# Patient Record
Sex: FEMALE | Employment: FULL TIME | URBAN - METROPOLITAN AREA
[De-identification: names, ages, dates, MRNs, and addresses within clinical notes are randomized per-mention and may not be internally consistent; named-entity substitution may affect disease eponyms.]

---

## 2017-05-15 ENCOUNTER — APPOINTMENT (OUTPATIENT)
Dept: CT IMAGING | Age: 42
End: 2017-05-15
Attending: EMERGENCY MEDICINE
Payer: COMMERCIAL

## 2017-05-15 ENCOUNTER — HOSPITAL ENCOUNTER (EMERGENCY)
Age: 42
Discharge: HOME | End: 2017-05-15
Attending: EMERGENCY MEDICINE
Payer: COMMERCIAL

## 2017-05-15 VITALS
SYSTOLIC BLOOD PRESSURE: 146 MMHG | HEART RATE: 89 BPM | BODY MASS INDEX: 37.39 KG/M2 | TEMPERATURE: 97.9 F | OXYGEN SATURATION: 98 % | RESPIRATION RATE: 23 BRPM | HEIGHT: 63 IN | WEIGHT: 211 LBS | DIASTOLIC BLOOD PRESSURE: 82 MMHG

## 2017-05-15 DIAGNOSIS — R73.9 HYPERGLYCEMIA: ICD-10-CM

## 2017-05-15 DIAGNOSIS — R20.2 PARESTHESIA AND PAIN OF RIGHT EXTREMITY: Primary | ICD-10-CM

## 2017-05-15 DIAGNOSIS — M79.609 PARESTHESIA AND PAIN OF RIGHT EXTREMITY: Primary | ICD-10-CM

## 2017-05-15 DIAGNOSIS — E28.2 PCOS (POLYCYSTIC OVARIAN SYNDROME): ICD-10-CM

## 2017-05-15 LAB
ANION GAP BLD CALC-SCNC: 6 MMOL/L (ref 3–18)
APPEARANCE UR: CLEAR
ATRIAL RATE: 93 BPM
BACTERIA URNS QL MICRO: ABNORMAL /HPF
BASOPHILS # BLD AUTO: 0 K/UL (ref 0–0.06)
BASOPHILS # BLD: 0 % (ref 0–2)
BILIRUB UR QL: NEGATIVE
BUN SERPL-MCNC: 10 MG/DL (ref 7–18)
BUN/CREAT SERPL: 13 (ref 12–20)
CALCIUM SERPL-MCNC: 9 MG/DL (ref 8.5–10.1)
CALCULATED P AXIS, ECG09: 66 DEGREES
CALCULATED R AXIS, ECG10: 69 DEGREES
CALCULATED T AXIS, ECG11: 34 DEGREES
CHLORIDE SERPL-SCNC: 101 MMOL/L (ref 100–108)
CK MB CFR SERPL CALC: 0.8 % (ref 0–4)
CK MB SERPL-MCNC: 1 NG/ML (ref 5–25)
CK SERPL-CCNC: 129 U/L (ref 26–192)
CO2 SERPL-SCNC: 33 MMOL/L (ref 21–32)
COLOR UR: YELLOW
CREAT SERPL-MCNC: 0.76 MG/DL (ref 0.6–1.3)
DIAGNOSIS, 93000: NORMAL
DIFFERENTIAL METHOD BLD: ABNORMAL
EOSINOPHIL # BLD: 0.2 K/UL (ref 0–0.4)
EOSINOPHIL NFR BLD: 2 % (ref 0–5)
EPITH CASTS URNS QL MICRO: ABNORMAL /LPF (ref 0–5)
ERYTHROCYTE [DISTWIDTH] IN BLOOD BY AUTOMATED COUNT: 14.1 % (ref 11.6–14.5)
GLUCOSE SERPL-MCNC: 189 MG/DL (ref 74–99)
GLUCOSE UR STRIP.AUTO-MCNC: 500 MG/DL
HCT VFR BLD AUTO: 39.5 % (ref 35–45)
HGB BLD-MCNC: 12.7 G/DL (ref 12–16)
HGB UR QL STRIP: NEGATIVE
KETONES UR QL STRIP.AUTO: NEGATIVE MG/DL
LEUKOCYTE ESTERASE UR QL STRIP.AUTO: ABNORMAL
LYMPHOCYTES # BLD AUTO: 27 % (ref 21–52)
LYMPHOCYTES # BLD: 2.7 K/UL (ref 0.9–3.6)
MCH RBC QN AUTO: 27.7 PG (ref 24–34)
MCHC RBC AUTO-ENTMCNC: 32.2 G/DL (ref 31–37)
MCV RBC AUTO: 86.2 FL (ref 74–97)
MONOCYTES # BLD: 0.6 K/UL (ref 0.05–1.2)
MONOCYTES NFR BLD AUTO: 6 % (ref 3–10)
NEUTS SEG # BLD: 6.5 K/UL (ref 1.8–8)
NEUTS SEG NFR BLD AUTO: 65 % (ref 40–73)
NITRITE UR QL STRIP.AUTO: NEGATIVE
P-R INTERVAL, ECG05: 154 MS
PH UR STRIP: 7 [PH] (ref 5–8)
PLATELET # BLD AUTO: 424 K/UL (ref 135–420)
PMV BLD AUTO: 9.6 FL (ref 9.2–11.8)
POTASSIUM SERPL-SCNC: 3.4 MMOL/L (ref 3.5–5.5)
PROT UR STRIP-MCNC: NEGATIVE MG/DL
Q-T INTERVAL, ECG07: 354 MS
QRS DURATION, ECG06: 74 MS
QTC CALCULATION (BEZET), ECG08: 440 MS
RBC # BLD AUTO: 4.58 M/UL (ref 4.2–5.3)
RBC #/AREA URNS HPF: ABNORMAL /HPF (ref 0–5)
SODIUM SERPL-SCNC: 140 MMOL/L (ref 136–145)
SP GR UR REFRACTOMETRY: 1.02 (ref 1–1.03)
TROPONIN I SERPL-MCNC: <0.02 NG/ML (ref 0–0.06)
TSH SERPL DL<=0.05 MIU/L-ACNC: 1.19 UIU/ML (ref 0.36–3.74)
UROBILINOGEN UR QL STRIP.AUTO: 1 EU/DL (ref 0.2–1)
VENTRICULAR RATE, ECG03: 93 BPM
WBC # BLD AUTO: 10 K/UL (ref 4.6–13.2)
WBC URNS QL MICRO: ABNORMAL /HPF (ref 0–4)

## 2017-05-15 PROCEDURE — 81001 URINALYSIS AUTO W/SCOPE: CPT | Performed by: EMERGENCY MEDICINE

## 2017-05-15 PROCEDURE — 70450 CT HEAD/BRAIN W/O DYE: CPT

## 2017-05-15 PROCEDURE — 82550 ASSAY OF CK (CPK): CPT | Performed by: EMERGENCY MEDICINE

## 2017-05-15 PROCEDURE — 84480 ASSAY TRIIODOTHYRONINE (T3): CPT | Performed by: EMERGENCY MEDICINE

## 2017-05-15 PROCEDURE — 99284 EMERGENCY DEPT VISIT MOD MDM: CPT

## 2017-05-15 PROCEDURE — 85025 COMPLETE CBC W/AUTO DIFF WBC: CPT | Performed by: EMERGENCY MEDICINE

## 2017-05-15 PROCEDURE — 80048 BASIC METABOLIC PNL TOTAL CA: CPT | Performed by: EMERGENCY MEDICINE

## 2017-05-15 PROCEDURE — 84443 ASSAY THYROID STIM HORMONE: CPT | Performed by: EMERGENCY MEDICINE

## 2017-05-15 PROCEDURE — 93005 ELECTROCARDIOGRAM TRACING: CPT

## 2017-05-15 PROCEDURE — 99283 EMERGENCY DEPT VISIT LOW MDM: CPT

## 2017-05-15 RX ORDER — ACYCLOVIR 800 MG/1
800 TABLET ORAL 2 TIMES DAILY
COMMUNITY

## 2017-05-15 NOTE — LETTER
NOTIFICATION RETURN TO WORK / SCHOOL 
 
5/15/2017 3:29 PM 
 
Ms. Helga Martines [address] [place] [id number] To Whom It May Concern: 
 
Abril Cavazos is currently under the care of [id number] [place] [address] EMERGENCY DEPT. She will return to work/school on: 5/18/17 If there are questions or concerns please have the patient contact our office.  
 
 
 
Sincerely, 
 
 
 
Mihai Hardy MD

## 2017-05-15 NOTE — ED TRIAGE NOTES
Pt reports right sided face, arm and chest tightness and numbness since 1000. Pt denies any pain, nausea, vomiting, diarrhea or fever. Pt reports generalized fatigue x 3 days. Pt ambulatory with steady gait, moving all extremities without difficulty and no facial droop present. Pt denies any visual changes. Pt text ng on cell phone without difficulty after triage completed.

## 2017-05-15 NOTE — ED NOTES
Valencia Morrow is a 35 y.o. female that was discharged in stable. Pt was accompanied by self. Pt is driving. The patients diagnosis, condition and treatment were explained to  patient and aftercare instructions were given. The patient verbalized understanding. Patient armband removed and shredded.

## 2017-05-15 NOTE — ED PROVIDER NOTES
HPI Comments: 1:39 PM Nathaniel Naylor is a 35 y.o. Female, G-4, P-2, A-2 (misscarriages) with a hx of PCOS who presents to the ED c/o right sided numbness that extends from the right forehead down her face and into her upper and lower extremities that started at 1000 this morning. She reports that she has been fatigued over the past three days and two days ago she began to experiencing some mid back pain upon waking up. She initially thought that her sx were due to not sleeping well, but she became concerned when she started to experience the right sided numbness this morning, so she decided to report to the ED. 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 Her father  in his [de-identified] of alzheimer's disease;     [address] [place] [address]  her mother has a hx of HTN, CVA x2, and MI x2 but she is living and over [de-identified] at this time; her sister has a hx of diabetes. She just finished her period last week and her menses are regular despite her hx of PCOS. She denies hx of CVA, hx of HTN, hx of diabetes, trauma, chest pain, HA, and any further complaints. Soc Hx Non smoker/etoh    The history is provided by the patient. Past Medical History:   Diagnosis Date    PCOS (polycystic ovarian syndrome)     Strabismus        Past Surgical History:   Procedure Laterality Date    HX GYN      c/s x 2         History reviewed. No pertinent family history. Social History     Social History    Marital status:      Spouse name: N/A    Number of children: N/A    Years of education: N/A     Occupational History    Not on file.      Social History Main Topics    Smoking status: Never Smoker    Smokeless tobacco: Not on file    Alcohol use Yes      Comment: social    Drug use: No    Sexual activity: Yes     Birth control/ protection: None     Other Topics Concern    Not on file     Social History Narrative         ALLERGIES: Review of patient's allergies indicates no known allergies. Review of Systems   Constitutional: Positive for fatigue. Negative for chills and fever. HENT: Negative for congestion and sore throat. Respiratory: Negative for cough and shortness of breath. Cardiovascular: Negative for chest pain and leg swelling. Gastrointestinal: Negative for abdominal pain and nausea. Genitourinary: Negative for dysuria and hematuria. Musculoskeletal: Positive for back pain. Skin: Negative for rash and wound. Neurological: Positive for numbness. Negative for syncope, light-headedness and headaches. Psychiatric/Behavioral: Negative for behavioral problems. The patient is not nervous/anxious. Vitals:    05/15/17 1307   BP: (!) 160/102   Pulse: 95   Resp: 16   Temp: 97.9 °F (36.6 °C)   SpO2: 97%   Weight: 95.7 kg (211 lb)   Height: 5' 3\" (1.6 m)            Physical Exam   Constitutional: She is oriented to person, place, and time. She appears well-developed and well-nourished. No distress. HENT:   Head: Normocephalic. Right Ear: External ear normal.   Left Ear: External ear normal.   Mouth/Throat: No oropharyngeal exudate. Eyes: Conjunctivae and EOM are normal. Pupils are equal, round, and reactive to light. Right eye exhibits no discharge. Left eye exhibits no discharge. No scleral icterus. Neck: Normal range of motion. Neck supple. No tracheal deviation present. No thyromegaly present. Cardiovascular: Normal rate, regular rhythm and normal heart sounds. Exam reveals no gallop and no friction rub. No murmur heard. Pulmonary/Chest: Effort normal and breath sounds normal. No stridor. No respiratory distress. She has no wheezes. She has no rales. She exhibits no tenderness. Abdominal: Soft. She exhibits no distension and no mass. There is no tenderness. There is no rebound and no guarding.    Musculoskeletal: She exhibits no edema or tenderness. Lymphadenopathy:     She has no cervical adenopathy. Neurological: She is alert and oriented to person, place, and time. She has normal reflexes. No cranial nerve deficit. Coordination normal.   DANIELLE EOM OK. h-HIT normal response. Visual fields OK  Reflexes symmetrical at zero. Skin: Skin is warm. Psychiatric: She has a normal mood and affect. Her behavior is normal. Judgment and thought content normal.        MDM  Number of Diagnoses or Management Options  Hyperglycemia:   Paresthesia and pain of right extremity:   PCOS (polycystic ovarian syndrome):   Diagnosis management comments: Imp: Working 12 hrs daily - 2 children and - stressed. PCOS with hyperglycemia - to LMD for ecvaluation of new onset diabetes. ED Course       Procedures    Vitals:  Patient Vitals for the past 12 hrs:   Temp Pulse Resp BP SpO2   05/15/17 1307 97.9 °F (36.6 °C) 95 16 (!) 160/102 97 %     Pulse ox reviewed and WNL    Medications ordered:   Medications - No data to display      Lab findings:  Recent Results (from the past 12 hour(s))   EKG, 12 LEAD, INITIAL    Collection Time: 05/15/17  1:06 PM   Result Value Ref Range    Ventricular Rate 93 BPM    Atrial Rate 93 BPM    P-R Interval 154 ms    QRS Duration 74 ms    Q-T Interval 354 ms    QTC Calculation (Bezet) 440 ms    Calculated P Axis 66 degrees    Calculated R Axis 69 degrees    Calculated T Axis 34 degrees    Diagnosis       Normal sinus rhythm  Normal ECG  No previous ECGs available         EKG interpretation by ED Physician:  none    X-Ray, CT or other radiology findings or impressions:  CT HEAD WO CONT    (Results Pending)       Progress notes, Consult notes or additional Procedure notes:       Disposition:  Diagnosis: No diagnosis found. Disposition: home.   Recent Results (from the past 12 hour(s))   EKG, 12 LEAD, INITIAL    Collection Time: 05/15/17  1:06 PM   Result Value Ref Range    Ventricular Rate 93 BPM    Atrial Rate 93 BPM P-R Interval 154 ms    QRS Duration 74 ms    Q-T Interval 354 ms    QTC Calculation (Bezet) 440 ms    Calculated P Axis 66 degrees    Calculated R Axis 69 degrees    Calculated T Axis 34 degrees    Diagnosis       Normal sinus rhythm  Normal ECG  No previous ECGs available  Confirmed by Ciara Morales MD, --- ([id number]) on 5/15/2017 2:49:23 PM     CBC WITH AUTOMATED DIFF    Collection Time: 05/15/17  1:35 PM   Result Value Ref Range    WBC 10.0 4.6 - 13.2 K/uL    RBC 4.58 4.20 - 5.30 M/uL    HGB 12.7 12.0 - 16.0 g/dL    HCT 39.5 35.0 - 45.0 %    MCV 86.2 74.0 - 97.0 FL    MCH 27.7 24.0 - 34.0 PG    MCHC 32.2 31.0 - 37.0 g/dL    RDW 14.1 11.6 - 14.5 %    PLATELET 057 (H) 116 - 420 K/uL    MPV 9.6 9.2 - 11.8 FL    NEUTROPHILS 65 40 - 73 %    LYMPHOCYTES 27 21 - 52 %    MONOCYTES 6 3 - 10 %    EOSINOPHILS 2 0 - 5 %    BASOPHILS 0 0 - 2 %    ABS. NEUTROPHILS 6.5 1.8 - 8.0 K/UL    ABS. LYMPHOCYTES 2.7 0.9 - 3.6 K/UL    ABS. MONOCYTES 0.6 0.05 - 1.2 K/UL    ABS. EOSINOPHILS 0.2 0.0 - 0.4 K/UL    ABS.  BASOPHILS 0.0 0.0 - 0.06 K/UL    DF AUTOMATED     METABOLIC PANEL, BASIC    Collection Time: 05/15/17  1:35 PM   Result Value Ref Range    Sodium 140 136 - 145 mmol/L    Potassium 3.4 (L) 3.5 - 5.5 mmol/L    Chloride 101 100 - 108 mmol/L    CO2 33 (H) 21 - 32 mmol/L    Anion gap 6 3.0 - 18 mmol/L    Glucose 189 (H) 74 - 99 mg/dL    BUN 10 7.0 - 18 MG/DL    Creatinine 0.76 0.6 - 1.3 MG/DL    BUN/Creatinine ratio 13 12 - 20      GFR est AA >60 >60 ml/min/1.73m2    GFR est non-AA >60 >60 ml/min/1.73m2    Calcium 9.0 8.5 - 10.1 MG/DL   CARDIAC PANEL,(CK, CKMB & TROPONIN)    Collection Time: 05/15/17  1:35 PM   Result Value Ref Range     26 - 192 U/L    CK - MB 1.0 <3.6 ng/ml    CK-MB Index 0.8 0.0 - 4.0 %    Troponin-I, Qt. <0.02 0.00 - 0.06 NG/ML   TSH 3RD GENERATION    Collection Time: 05/15/17  1:35 PM   Result Value Ref Range    TSH 1.19 0.36 - 3.74 uIU/mL   URINALYSIS W/ RFLX MICROSCOPIC    Collection Time: 05/15/17  2:00 PM Result Value Ref Range    Color YELLOW      Appearance CLEAR      Specific gravity 1.023 1.005 - 1.030      pH (UA) 7.0 5.0 - 8.0      Protein NEGATIVE  NEG mg/dL    Glucose 500 (A) NEG mg/dL    Ketone NEGATIVE  NEG mg/dL    Bilirubin NEGATIVE  NEG      Blood NEGATIVE  NEG      Urobilinogen 1.0 0.2 - 1.0 EU/dL    Nitrites NEGATIVE  NEG      Leukocyte Esterase TRACE (A) NEG     URINE MICROSCOPIC ONLY    Collection Time: 05/15/17  2:00 PM   Result Value Ref Range    WBC 0 to 3 0 - 4 /hpf    RBC NONE 0 - 5 /hpf    Epithelial cells 2+ 0 - 5 /lpf    Bacteria 3+ (A) NEG /hpf       Medications ordered:   Medications - No data to display      Ct Head Wo Cont    Result Date: 5/15/2017  EXAM: CT of the Head without contrast INDICATION: Right-sided face, arm and chest tightness. TECHNIQUE: CT of the head from the vertex to the skull base performed. No IV contrast administered. All CT scans at this facility are performed using dose optimization technique as appropriate to a performed exam, to include automated exposure control, adjustment of the mA and/or kV according to patient size (including appropriate matching for site specific examination) or use of iterative reconstruction technique. COMPARISON: None. FINDINGS: No evidence of acute intra-axial or extra-axial hemorrhage. The ventricles and sulci are symmetric. No midline shift, mass effect or mass lesion appreciated. The gray-white junction is preserved. No evidence of an acute infarct identified. The mastoid air cells are well aerated. The paranasal sinuses are unremarkable. The orbits are normal. The scalp and skull are unremarkable. IMPRESSION: 1. No acute intracranial process identified. 1. Paresthesia and pain of right extremity    2. PCOS (polycystic ovarian syndrome)    3. Hyperglycemia            Follow-up Information     None      To family MD for evaluation of new onset diabetes.      Patient's Medications   Start Taking    No medications on file Continue Taking    ACYCLOVIR (ZOVIRAX) 800 MG TABLET    Take 800 mg by mouth two (2) times a day. METFORMIN (GLUCOPHAGE) 500 MG TABLET    Take 500 mg by mouth two (2) times daily (with meals). PNV COMB #2-IRON-OMEGA 3-FA 29-1-250 MG CMPK    Take  by mouth. These Medications have changed    No medications on file   Stop Taking    No medications on file       SCRIBE ATTESTATION STATEMENT  Documented by: Meghan Ahmadi for, and in the presence of, Mamadou Quach MD 1:40 PM     Signed by: Romel Carson, 05/15/17 1:40 PM     PROVIDER ATTESTATION STATEMENT  I personally performed the services described in the documentation, reviewed the documentation, as recorded by the scribe in my presence, and it accurately and completely records my words and actions.   Mamadou Quach MD

## 2017-05-15 NOTE — DISCHARGE INSTRUCTIONS
Learning About High Blood Sugar  What is high blood sugar? Your body turns the food you eat into glucose (sugar), which it uses for energy. But if your body isn't able to use the sugar right away, it can build up in your blood and lead to high blood sugar. When the amount of sugar in your blood stays too high for too much of the time, you may have diabetes. Diabetes is a disease that can cause serious health problems. The good news is that lifestyle changes may help you get your blood sugar back to normal and avoid or delay diabetes. What causes high blood sugar? Sugar (glucose) can build up in your blood if you:  · Are overweight. · Have a family history of diabetes. · Take certain medicines, such as steroids. What are the symptoms? Having high blood sugar may not cause any symptoms at all. Or it may make you feel very thirsty or very hungry. You may also urinate more often than usual, have blurry vision, or lose weight without trying. How is high blood sugar treated? You can take steps to lower your blood sugar level if you understand what makes it get higher. Your doctor may want you to learn how to test your blood sugar level at home. Then you can see how illness, stress, or different kinds of food or medicine raise or lower your blood sugar level. Other tests may be needed to see if you have diabetes. How can you prevent high blood sugar? · Watch your weight. If you're overweight, losing just a small amount of weight may help. Reducing fat around your waist is most important. · Limit the amount of calories, sweets, and unhealthy fat you eat. Ask your doctor if a dietitian can help you. A registered dietitian can help you create meal plans that fit your lifestyle. · Get at least 30 minutes of exercise on most days of the week. Exercise helps control your blood sugar. It also helps you maintain a healthy weight. Walking is a good choice.  You also may want to do other activities, such as running, swimming, cycling, or playing tennis or team sports. · If your doctor prescribed medicines, take them exactly as prescribed. Call your doctor if you think you are having a problem with your medicine. You will get more details on the specific medicines your doctor prescribes. Follow-up care is a key part of your treatment and safety. Be sure to make and go to all appointments, and call your doctor if you are having problems. It's also a good idea to know your test results and keep a list of the medicines you take. Where can you learn more? Go to http://sonam-mauro.info/. Enter O108 in the search box to learn more about \"Learning About High Blood Sugar. \"  Current as of: May 23, 2016  Content Version: 11.2  © 0568-3224 [employer]. Care instructions adapted under license by [employer] (which disclaims liability or warranty for this information). If you have questions about a medical condition or this instruction, always ask your healthcare professional. [address] any warranty or liability for your use of this information.

## 2017-05-16 LAB — T3 SERPL-MCNC: 114 NG/DL (ref 71–180)

## 2018-05-17 ENCOUNTER — HOSPITAL ENCOUNTER (EMERGENCY)
Age: 43
Discharge: HOME | End: 2018-05-17
Attending: EMERGENCY MEDICINE
Payer: COMMERCIAL

## 2018-05-17 VITALS
RESPIRATION RATE: 16 BRPM | TEMPERATURE: 97 F | BODY MASS INDEX: 37.21 KG/M2 | HEART RATE: 87 BPM | WEIGHT: 210 LBS | DIASTOLIC BLOOD PRESSURE: 78 MMHG | HEIGHT: 63 IN | OXYGEN SATURATION: 95 % | SYSTOLIC BLOOD PRESSURE: 120 MMHG

## 2018-05-17 DIAGNOSIS — R04.0 EPISTAXIS: Primary | ICD-10-CM

## 2018-05-17 PROCEDURE — 99282 EMERGENCY DEPT VISIT SF MDM: CPT

## 2018-05-17 PROCEDURE — 74011250637 HC RX REV CODE- 250/637: Performed by: PHYSICIAN ASSISTANT

## 2018-05-17 RX ORDER — OXYMETAZOLINE HCL 0.05 %
2 SPRAY, NON-AEROSOL (ML) NASAL
Status: COMPLETED | OUTPATIENT
Start: 2018-05-17 | End: 2018-05-17

## 2018-05-17 RX ADMIN — OXYMETAZOLINE HYDROCHLORIDE 2 SPRAY: 5 SPRAY NASAL at 10:17

## 2018-05-17 NOTE — ED PROVIDER NOTES
EMERGENCY DEPARTMENT HISTORY AND PHYSICAL EXAM    10:16 AM      Date: 5/17/2018  Patient Name: Flores Astudillo    History of Presenting Illness     Chief Complaint   Patient presents with    Epistaxis         History Provided By: Patient    Chief Complaint: presents with R nostril bleeding started today while she was driving to work which had since resolved. Denies of any trauma no headache, not taking any blood thinner. No blood pressure issues or hx of similar bleed in the past.   Duration:  today   Timing:  Acute  Location: as noted above  Quality: no pain   Severity: Mild  Modifying Factors: as noted above  Associated Symptoms: denies any other associated signs or symptoms      Additional History (Context): Flores Astudillo is a 38 y.o. female with No significant past medical history who presents with sx as noted above. Ashlee Murray PCP: Francisco Sandoval MD    Current Outpatient Prescriptions   Medication Sig Dispense Refill    acyclovir (ZOVIRAX) 800 mg tablet Take 800 mg by mouth two (2) times a day.  PNV Comb #2-Iron-Omega 3-FA 29-1-250 mg cmpk Take  by mouth.  metFORMIN (GLUCOPHAGE) 500 mg tablet Take 500 mg by mouth two (2) times daily (with meals). Past History     Past Medical History:  Past Medical History:   Diagnosis Date    PCOS (polycystic ovarian syndrome)     Strabismus        Past Surgical History:  Past Surgical History:   Procedure Laterality Date    HX GYN      c/s x 2       Family History:  History reviewed. No pertinent family history. Social History:  Social History   Substance Use Topics    Smoking status: Never Smoker    Smokeless tobacco: None    Alcohol use Yes      Comment: social       Allergies:  No Known Allergies      Review of Systems       Review of Systems   Constitutional: Negative for fever. HENT: Positive for nosebleeds. Neurological: Negative for headaches. All other systems reviewed and are negative.         Physical Exam     Visit Vitals    /78 (BP 1 Location: Left arm, BP Patient Position: At rest)    Pulse 87    Temp 97 °F (36.1 °C)    Resp 16    Ht 5' 3\" (1.6 m)    Wt 95.3 kg (210 lb)    SpO2 95%    BMI 37.2 kg/m2         Physical Exam   Constitutional: She is oriented to person, place, and time. She appears well-developed and well-nourished. No distress. HENT:   Head: Normocephalic and atraumatic. Eyes: Conjunctivae and EOM are normal. Pupils are equal, round, and reactive to light. Neck: Normal range of motion. Cardiovascular: Normal rate, regular rhythm and normal heart sounds. Pulmonary/Chest: Effort normal and breath sounds normal. No respiratory distress. She has no wheezes. She has no rales. She exhibits no tenderness. Abdominal: Soft. Bowel sounds are normal. She exhibits no distension. There is no tenderness. There is no rebound and no guarding. Musculoskeletal: Normal range of motion. Neurological: She is alert and oriented to person, place, and time. Skin: Skin is warm. She is not diaphoretic. Psychiatric: She has a normal mood and affect. Her behavior is normal. Judgment and thought content normal.         Diagnostic Study Results     Labs -  No results found for this or any previous visit (from the past 12 hour(s)). Radiologic Studies -   No orders to display         Medical Decision Making   I am the first provider for this patient. I reviewed the vital signs, available nursing notes, past medical history, past surgical history, family history and social history. Vital Signs-Reviewed the patient's vital signs. Provider Notes (Medical Decision Making):  Placed afrin and with no risk factor of hypertension, no blood thinner and prior hx of nose  Bleed will not place rhino pack. Re assurance and discharged. Diagnosis     Clinical Impression:   1. Epistaxis        Disposition: HOME.      Follow-up Information     Follow up With Details Comments [facility] To establish care with primary care provider [address]    [address] [facility] EMERGENCY DEPT  As needed, If symptoms worsen [address] [facility]  [phone]           Patient's Medications   Start Taking    No medications on file   Continue Taking    ACYCLOVIR (ZOVIRAX) 800 MG TABLET    Take 800 mg by mouth two (2) times a day. METFORMIN (GLUCOPHAGE) 500 MG TABLET    Take 500 mg by mouth two (2) times daily (with meals). PNV COMB #2-IRON-OMEGA 3-FA 29-1-250 MG CMPK    Take  by mouth.    These Medications have changed    No medications on file   Stop Taking    No medications on file

## 2018-05-17 NOTE — DISCHARGE INSTRUCTIONS
Nosebleeds: Care Instructions  Your Care Instructions    Nosebleeds are common, especially if you have colds or allergies. Many things can cause a nosebleed. Some nosebleeds stop on their own with pressure. Others need packing. Some get cauterized (sealed). If you have gauze or other packing materials in your nose, you will need to follow up with your doctor to have the packing removed. You may need more treatment if you get nosebleeds a lot. The doctor has checked you carefully, but problems can develop later. If you notice any problems or new symptoms, get medical treatment right away. Follow-up care is a key part of your treatment and safety. Be sure to make and go to all appointments, and call your doctor if you are having problems. It's also a good idea to know your test results and keep a list of the medicines you take. How can you care for yourself at home? · If you get another nosebleed:  ¨ Sit up and tilt your head slightly forward. This keeps blood from going down your throat. ¨ Use your thumb and index finger to pinch your nose shut for 10 minutes. Use a clock. Do not check to see if the bleeding has stopped before the 10 minutes are up. If the bleeding has not stopped, pinch your nose shut for another 10 minutes. ¨ When the bleeding has stopped, try not to pick, rub, or blow your nose for 12 hours. Avoiding these things helps keep your nose from bleeding again. · If your doctor prescribed antibiotics, take them as directed. Do not stop taking them just because you feel better. You need to take the full course of antibiotics. To prevent nosebleeds  · Do not blow your nose too hard. · Try not to lift or strain after a nosebleed. · Raise your head on a pillow while you sleep. · Put a thin layer of a saline- or water-based nasal gel, such as NasoGel, inside your nose. Put it on the septum, which divides your nostrils. This will prevent dryness that can cause nosebleeds.   · Use a vaporizer or humidifier to add moisture to your bedroom. Follow the directions for cleaning the machine. · Do not use aspirin, ibuprofen (Advil, Motrin), or naproxen (Aleve) for 36 to 48 hours after a nosebleed unless your doctor tells you to. You can use acetaminophen (Tylenol) for pain relief. · Talk to your doctor about stopping any other medicines you are taking. Some medicines may make you more likely to get a nosebleed. · Do not use cold medicines or nasal sprays without first talking to your doctor. They can make your nose dry. When should you call for help? Call 911 anytime you think you may need emergency care. For example, call if:  ? · You passed out (lost consciousness). ?Call your doctor now or seek immediate medical care if:  ? · You get another nosebleed and your nose is still bleeding after you have applied pressure 3 times for 10 minutes each time (30 minutes total). ? · There is a lot of blood running down the back of your throat even after you pinch your nose and tilt your head forward. ? · You have a fever. ? · You have sinus pain. ? Watch closely for changes in your health, and be sure to contact your doctor if:  ? · You get nosebleeds often, even if they stop. ? · You do not get better as expected. Where can you learn more? Go to http://sonam-mauro.info/. Enter S156 in the search box to learn more about \"Nosebleeds: Care Instructions. \"  Current as of: March 20, 2017  Content Version: 11.4  © 2303-1659 [employer]. Care instructions adapted under license by [employer] (which disclaims liability or warranty for this information). If you have questions about a medical condition or this instruction, always ask your healthcare professional. [address] any warranty or liability for your use of this information. MyChart Activation    Thank you for requesting access to [employer].  Please follow the instructions below to securely access and download your online medical record. [employer] allows you to send messages to your doctor, view your test results, renew your prescriptions, schedule appointments, and more. How Do I Sign Up? 1. In your internet browser, go to [email or web address]  2. Click on the First Time User? Click Here link in the Sign In box. You will be redirect to the New Member Sign Up page. 3. Enter your [employer] Access Code exactly as it appears below. You will not need to use this code after youve completed the sign-up process. If you do not sign up before the expiration date, you must request a new code. [employer] Access Code: [email or web address]  Expires: 8/15/2018 10:11 AM (This is the date your [employer] access code will )    4. Enter the last four digits of your Social Security Number (xxxx) and Date of Birth (mm/dd/yyyy) as indicated and click Submit. You will be taken to the next sign-up page. 5. Create a [employer] ID. This will be your [employer] login ID and cannot be changed, so think of one that is secure and easy to remember. 6. Create a [employer] password. You can change your password at any time. 7. Enter your Password Reset Question and Answer. This can be used at a later time if you forget your password. 8. Enter your e-mail address. You will receive e-mail notification when new information is available in [address] 9. Click Sign Up. You can now view and download portions of your medical record. 10. Click the Download Summary menu link to download a portable copy of your medical information. Additional Information    If you have questions, please visit the Frequently Asked Questions section of the [employer] website at [email or web address] Remember, [employer] is NOT to be used for urgent needs. For medical emergencies, dial 911.

## 2020-07-29 ENCOUNTER — HOSPITAL ENCOUNTER (OUTPATIENT)
Dept: MAMMOGRAPHY | Age: 45
Discharge: HOME | End: 2020-07-29
Attending: FAMILY MEDICINE
Payer: COMMERCIAL

## 2020-07-29 DIAGNOSIS — Z12.31 VISIT FOR SCREENING MAMMOGRAM: ICD-10-CM

## 2020-07-29 PROCEDURE — 77063 BREAST TOMOSYNTHESIS BI: CPT

## 2020-08-26 ENCOUNTER — HOSPITAL ENCOUNTER (OUTPATIENT)
Dept: LAB | Age: 45
Discharge: HOME | End: 2020-08-26
Payer: COMMERCIAL

## 2020-08-26 DIAGNOSIS — E11.65 TYPE II OR UNSPECIFIED TYPE DIABETES MELLITUS WITH RENAL MANIFESTATIONS, UNCONTROLLED(250.42) (HCC): ICD-10-CM

## 2020-08-26 DIAGNOSIS — E11.29 TYPE II OR UNSPECIFIED TYPE DIABETES MELLITUS WITH RENAL MANIFESTATIONS, UNCONTROLLED(250.42) (HCC): ICD-10-CM

## 2020-08-26 DIAGNOSIS — R53.83 TIREDNESS: ICD-10-CM

## 2020-08-26 LAB
25(OH)D3 SERPL-MCNC: 18.6 NG/ML (ref 30–100)
ALT SERPL-CCNC: 39 U/L (ref 13–56)
ANION GAP SERPL CALC-SCNC: 3 MMOL/L (ref 3–18)
AST SERPL-CCNC: 14 U/L (ref 10–38)
ATRIAL RATE: 87 BPM
BASOPHILS # BLD: 0 K/UL (ref 0–0.1)
BASOPHILS NFR BLD: 0 % (ref 0–2)
BUN SERPL-MCNC: 7 MG/DL (ref 7–18)
BUN/CREAT SERPL: 9 (ref 12–20)
CALCIUM SERPL-MCNC: 9.7 MG/DL (ref 8.5–10.1)
CALCULATED P AXIS, ECG09: 63 DEGREES
CALCULATED R AXIS, ECG10: 62 DEGREES
CALCULATED T AXIS, ECG11: 35 DEGREES
CHLORIDE SERPL-SCNC: 105 MMOL/L (ref 100–111)
CHOLEST SERPL-MCNC: 265 MG/DL
CK SERPL-CCNC: 104 U/L (ref 26–192)
CO2 SERPL-SCNC: 30 MMOL/L (ref 21–32)
CREAT SERPL-MCNC: 0.75 MG/DL (ref 0.6–1.3)
CREAT UR-MCNC: 108 MG/DL (ref 30–125)
DIAGNOSIS, 93000: NORMAL
DIFFERENTIAL METHOD BLD: ABNORMAL
EOSINOPHIL # BLD: 0.2 K/UL (ref 0–0.4)
EOSINOPHIL NFR BLD: 2 % (ref 0–5)
ERYTHROCYTE [DISTWIDTH] IN BLOOD BY AUTOMATED COUNT: 13.9 % (ref 11.6–14.5)
GLUCOSE SERPL-MCNC: 150 MG/DL (ref 74–99)
HBA1C MFR BLD: 7.1 % (ref 4.2–5.6)
HCT VFR BLD AUTO: 36 % (ref 35–45)
HDLC SERPL-MCNC: 49 MG/DL (ref 40–60)
HDLC SERPL: 5.4 {RATIO} (ref 0–5)
HGB BLD-MCNC: 12.1 G/DL (ref 12–16)
LDLC SERPL CALC-MCNC: 173.2 MG/DL (ref 0–100)
LIPASE SERPL-CCNC: 123 U/L (ref 73–393)
LIPID PROFILE,FLP: ABNORMAL
LYMPHOCYTES # BLD: 2.9 K/UL (ref 0.9–3.6)
LYMPHOCYTES NFR BLD: 38 % (ref 21–52)
MCH RBC QN AUTO: 28.5 PG (ref 24–34)
MCHC RBC AUTO-ENTMCNC: 33.6 G/DL (ref 31–37)
MCV RBC AUTO: 84.7 FL (ref 74–97)
MICROALBUMIN UR-MCNC: 0.83 MG/DL (ref 0–3)
MICROALBUMIN/CREAT UR-RTO: 8 MG/G (ref 0–30)
MONOCYTES # BLD: 0.6 K/UL (ref 0.05–1.2)
MONOCYTES NFR BLD: 8 % (ref 3–10)
NEUTS SEG # BLD: 4.1 K/UL (ref 1.8–8)
NEUTS SEG NFR BLD: 52 % (ref 40–73)
P-R INTERVAL, ECG05: 162 MS
PLATELET # BLD AUTO: 455 K/UL (ref 135–420)
PMV BLD AUTO: 9.8 FL (ref 9.2–11.8)
POTASSIUM SERPL-SCNC: 4.4 MMOL/L (ref 3.5–5.5)
Q-T INTERVAL, ECG07: 362 MS
QRS DURATION, ECG06: 66 MS
QTC CALCULATION (BEZET), ECG08: 435 MS
RBC # BLD AUTO: 4.25 M/UL (ref 4.2–5.3)
SODIUM SERPL-SCNC: 138 MMOL/L (ref 136–145)
TRIGL SERPL-MCNC: 214 MG/DL (ref ?–150)
TROPONIN I SERPL-MCNC: <0.02 NG/ML (ref 0–0.04)
VENTRICULAR RATE, ECG03: 87 BPM
VLDLC SERPL CALC-MCNC: 42.8 MG/DL
WBC # BLD AUTO: 7.7 K/UL (ref 4.6–13.2)

## 2020-08-26 PROCEDURE — 36415 COLL VENOUS BLD VENIPUNCTURE: CPT

## 2020-08-26 PROCEDURE — 82306 VITAMIN D 25 HYDROXY: CPT

## 2020-08-26 PROCEDURE — 82550 ASSAY OF CK (CPK): CPT

## 2020-08-26 PROCEDURE — 93005 ELECTROCARDIOGRAM TRACING: CPT

## 2020-08-26 PROCEDURE — 85025 COMPLETE CBC W/AUTO DIFF WBC: CPT

## 2020-08-26 PROCEDURE — 80048 BASIC METABOLIC PNL TOTAL CA: CPT

## 2020-08-26 PROCEDURE — 83036 HEMOGLOBIN GLYCOSYLATED A1C: CPT

## 2020-08-26 PROCEDURE — 80061 LIPID PANEL: CPT

## 2020-08-26 PROCEDURE — 84484 ASSAY OF TROPONIN QUANT: CPT

## 2020-08-26 PROCEDURE — 84450 TRANSFERASE (AST) (SGOT): CPT

## 2020-08-26 PROCEDURE — 84460 ALANINE AMINO (ALT) (SGPT): CPT

## 2020-08-26 PROCEDURE — 82043 UR ALBUMIN QUANTITATIVE: CPT

## 2020-08-26 PROCEDURE — 83690 ASSAY OF LIPASE: CPT

## 2024-04-30 ENCOUNTER — APPOINTMENT (OUTPATIENT)
Facility: HOSPITAL | Age: 49
End: 2024-04-30
Payer: COMMERCIAL

## 2024-04-30 ENCOUNTER — HOSPITAL ENCOUNTER (EMERGENCY)
Facility: HOSPITAL | Age: 49
Discharge: HOME | End: 2024-04-30
Attending: STUDENT IN AN ORGANIZED HEALTH CARE EDUCATION/TRAINING PROGRAM
Payer: COMMERCIAL

## 2024-04-30 VITALS
TEMPERATURE: 97.9 F | HEIGHT: 64 IN | BODY MASS INDEX: 31.41 KG/M2 | HEART RATE: 98 BPM | OXYGEN SATURATION: 96 % | SYSTOLIC BLOOD PRESSURE: 130 MMHG | RESPIRATION RATE: 20 BRPM | WEIGHT: 184 LBS | DIASTOLIC BLOOD PRESSURE: 87 MMHG

## 2024-04-30 DIAGNOSIS — R20.2 PARESTHESIAS IN RIGHT HAND: ICD-10-CM

## 2024-04-30 DIAGNOSIS — R60.9 DEPENDENT EDEMA: ICD-10-CM

## 2024-04-30 DIAGNOSIS — E11.65 TYPE 2 DIABETES MELLITUS WITH HYPERGLYCEMIA, WITHOUT LONG-TERM CURRENT USE OF INSULIN (HCC): ICD-10-CM

## 2024-04-30 DIAGNOSIS — Z86.39 HISTORY OF HYPERLIPIDEMIA: ICD-10-CM

## 2024-04-30 DIAGNOSIS — R42 POSITIONAL LIGHTHEADEDNESS: Primary | ICD-10-CM

## 2024-04-30 DIAGNOSIS — G56.01 CARPAL TUNNEL SYNDROME OF RIGHT WRIST: ICD-10-CM

## 2024-04-30 LAB
ALBUMIN SERPL-MCNC: 4 G/DL (ref 3.4–5)
ALBUMIN/GLOB SERPL: 0.8 (ref 0.8–1.7)
ALP SERPL-CCNC: 116 U/L (ref 45–117)
ALT SERPL-CCNC: 28 U/L (ref 13–56)
ANION GAP SERPL CALC-SCNC: 7 MMOL/L (ref 3–18)
AST SERPL-CCNC: 15 U/L (ref 10–38)
BASOPHILS # BLD: 0 K/UL (ref 0–0.1)
BASOPHILS NFR BLD: 0 % (ref 0–2)
BILIRUB SERPL-MCNC: 0.5 MG/DL (ref 0.2–1)
BUN SERPL-MCNC: 15 MG/DL (ref 7–18)
BUN/CREAT SERPL: 17 (ref 12–20)
CALCIUM SERPL-MCNC: 9.7 MG/DL (ref 8.5–10.1)
CHLORIDE SERPL-SCNC: 101 MMOL/L (ref 100–111)
CO2 SERPL-SCNC: 29 MMOL/L (ref 21–32)
CREAT SERPL-MCNC: 0.9 MG/DL (ref 0.6–1.3)
DIFFERENTIAL METHOD BLD: ABNORMAL
EKG ATRIAL RATE: 97 BPM
EKG DIAGNOSIS: NORMAL
EKG P AXIS: 45 DEGREES
EKG P-R INTERVAL: 156 MS
EKG Q-T INTERVAL: 354 MS
EKG QRS DURATION: 68 MS
EKG QTC CALCULATION (BAZETT): 449 MS
EKG R AXIS: 40 DEGREES
EKG T AXIS: 41 DEGREES
EKG VENTRICULAR RATE: 97 BPM
EOSINOPHIL # BLD: 0.1 K/UL (ref 0–0.4)
EOSINOPHIL NFR BLD: 1 % (ref 0–5)
ERYTHROCYTE [DISTWIDTH] IN BLOOD BY AUTOMATED COUNT: 13.6 % (ref 11.6–14.5)
GLOBULIN SER CALC-MCNC: 4.8 G/DL (ref 2–4)
GLUCOSE BLD STRIP.AUTO-MCNC: 122 MG/DL (ref 70–110)
GLUCOSE SERPL-MCNC: 149 MG/DL (ref 74–99)
HCT VFR BLD AUTO: 39.7 % (ref 35–45)
HGB BLD-MCNC: 13.5 G/DL (ref 12–16)
IMM GRANULOCYTES # BLD AUTO: 0.1 K/UL (ref 0–0.04)
IMM GRANULOCYTES NFR BLD AUTO: 0 % (ref 0–0.5)
LYMPHOCYTES # BLD: 4.1 K/UL (ref 0.9–3.6)
LYMPHOCYTES NFR BLD: 26 % (ref 21–52)
MAGNESIUM SERPL-MCNC: 2.2 MG/DL (ref 1.6–2.6)
MCH RBC QN AUTO: 29.2 PG (ref 24–34)
MCHC RBC AUTO-ENTMCNC: 34 G/DL (ref 31–37)
MCV RBC AUTO: 85.9 FL (ref 78–100)
MONOCYTES # BLD: 0.9 K/UL (ref 0.05–1.2)
MONOCYTES NFR BLD: 5 % (ref 3–10)
NEUTS SEG # BLD: 10.5 K/UL (ref 1.8–8)
NEUTS SEG NFR BLD: 67 % (ref 40–73)
NRBC # BLD: 0 K/UL (ref 0–0.01)
NRBC BLD-RTO: 0 PER 100 WBC
PLATELET # BLD AUTO: 427 K/UL (ref 135–420)
PMV BLD AUTO: 9.7 FL (ref 9.2–11.8)
POTASSIUM SERPL-SCNC: 3.7 MMOL/L (ref 3.5–5.5)
PROT SERPL-MCNC: 8.8 G/DL (ref 6.4–8.2)
RBC # BLD AUTO: 4.62 M/UL (ref 4.2–5.3)
SODIUM SERPL-SCNC: 137 MMOL/L (ref 136–145)
TROPONIN I SERPL HS-MCNC: <3 NG/L (ref 0–54)
TROPONIN I SERPL HS-MCNC: <3 NG/L (ref 0–54)
WBC # BLD AUTO: 15.7 K/UL (ref 4.6–13.2)

## 2024-04-30 PROCEDURE — 83735 ASSAY OF MAGNESIUM: CPT

## 2024-04-30 PROCEDURE — 99284 EMERGENCY DEPT VISIT MOD MDM: CPT

## 2024-04-30 PROCEDURE — 82962 GLUCOSE BLOOD TEST: CPT

## 2024-04-30 PROCEDURE — 70450 CT HEAD/BRAIN W/O DYE: CPT

## 2024-04-30 PROCEDURE — 2580000003 HC RX 258: Performed by: STUDENT IN AN ORGANIZED HEALTH CARE EDUCATION/TRAINING PROGRAM

## 2024-04-30 PROCEDURE — 85025 COMPLETE CBC W/AUTO DIFF WBC: CPT

## 2024-04-30 PROCEDURE — 93010 ELECTROCARDIOGRAM REPORT: CPT | Performed by: INTERNAL MEDICINE

## 2024-04-30 PROCEDURE — 93005 ELECTROCARDIOGRAM TRACING: CPT | Performed by: STUDENT IN AN ORGANIZED HEALTH CARE EDUCATION/TRAINING PROGRAM

## 2024-04-30 PROCEDURE — 80053 COMPREHEN METABOLIC PANEL: CPT

## 2024-04-30 PROCEDURE — 96360 HYDRATION IV INFUSION INIT: CPT

## 2024-04-30 PROCEDURE — 84484 ASSAY OF TROPONIN QUANT: CPT

## 2024-04-30 RX ORDER — 0.9 % SODIUM CHLORIDE 0.9 %
500 INTRAVENOUS SOLUTION INTRAVENOUS ONCE
Status: COMPLETED | OUTPATIENT
Start: 2024-04-30 | End: 2024-04-30

## 2024-04-30 RX ORDER — BUSPIRONE HYDROCHLORIDE 5 MG/1
5 TABLET ORAL 3 TIMES DAILY
COMMUNITY

## 2024-04-30 RX ORDER — AMLODIPINE BESYLATE 5 MG/1
5 TABLET ORAL DAILY
COMMUNITY

## 2024-04-30 RX ORDER — ATORVASTATIN CALCIUM 10 MG/1
10 TABLET, FILM COATED ORAL DAILY
COMMUNITY

## 2024-04-30 RX ADMIN — SODIUM CHLORIDE 500 ML: 9 INJECTION, SOLUTION INTRAVENOUS at 11:33

## 2024-04-30 ASSESSMENT — ENCOUNTER SYMPTOMS
VOMITING: 0
EYE PAIN: 0
NAUSEA: 0
COUGH: 0
PHOTOPHOBIA: 0
BACK PAIN: 0
SHORTNESS OF BREATH: 0

## 2024-04-30 ASSESSMENT — LIFESTYLE VARIABLES
HOW OFTEN DO YOU HAVE A DRINK CONTAINING ALCOHOL: NEVER
HOW MANY STANDARD DRINKS CONTAINING ALCOHOL DO YOU HAVE ON A TYPICAL DAY: PATIENT DOES NOT DRINK

## 2024-04-30 ASSESSMENT — PAIN SCALES - GENERAL: PAINLEVEL_OUTOF10: 0

## 2024-04-30 ASSESSMENT — PAIN - FUNCTIONAL ASSESSMENT: PAIN_FUNCTIONAL_ASSESSMENT: 0-10

## 2024-04-30 ASSESSMENT — HEART SCORE: ECG: NORMAL

## 2024-04-30 NOTE — ED PROVIDER NOTES
[facility] EMERGENCY DEPT  EMERGENCY DEPARTMENT ENCOUNTER       Pt Name: Frederic Parker  MRN: [id number]  Birthdate 1975  Date of evaluation: 4/30/2024  Provider: Kory Olivera DO   PCP: Leticia Aguirre APRN - NP  Note Started: 1:25 PM 4/30/24     CHIEF COMPLAINT       Chief Complaint   Patient presents with    Numbness    Dizziness        HISTORY OF PRESENT ILLNESS: 1 or more elements      History From: Patient  None     Frederic Parker is a 48 y.o. female who presents with dizziness that started just prior to arrival while she was at work.  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  She had to sit down so that she would not pass out.  Had some tunnel vision and blurred vision at that time, which is since resolved.  She reports feeling similarly in the past when she has been hyperglycemic.  Has been compliant with medications.  Patient also reporting right hand numbness that started 3 days ago and is somewhat improving but still present in the fingertips throughout.  She does a lot of work with her hands and has had this numbness before along with right wrist pain.  Also noted some bilateral ankle swelling yesterday which resolved with elevation of her legs.     Nursing Notes were all reviewed and agreed with or any disagreements were addressed in the HPI.     REVIEW OF SYSTEMS      Review of Systems   Constitutional:  Negative for chills and fever.   HENT:  Negative for tinnitus.    Eyes:  Negative for photophobia and pain.   Respiratory:  Negative for cough and shortness of breath.    Cardiovascular:  Positive for leg swelling. Negative for chest pain and palpitations.   Gastrointestinal:  Negative for nausea and vomiting.   Musculoskeletal:  Negative for back pain, neck pain and neck stiffness.   Neurological:  Negative for seizures, syncope, facial asymmetry, speech difficulty, weakness and headaches.

## 2024-04-30 NOTE — ED TRIAGE NOTES
Pt states she started feeling numbness in her right hand and right foot on Sunday night around 945pm. States she is a diabetic and just doesn't feel right. Started feeling dizzy at work earlier this morning

## 2024-04-30 NOTE — DISCHARGE INSTRUCTIONS
Follow-up with your primary care provider in the next 1 week for ER follow-up and further evaluation of symptoms if indicated.

## 2025-07-17 ENCOUNTER — HOSPITAL ENCOUNTER (EMERGENCY)
Age: 50
Discharge: HOME | End: 2025-07-17
Payer: MEDICAID

## 2025-07-17 ENCOUNTER — APPOINTMENT (OUTPATIENT)
Age: 50
End: 2025-07-17
Payer: MEDICAID

## 2025-07-17 VITALS
WEIGHT: 180 LBS | TEMPERATURE: 97.9 F | SYSTOLIC BLOOD PRESSURE: 138 MMHG | RESPIRATION RATE: 13 BRPM | HEIGHT: 63 IN | DIASTOLIC BLOOD PRESSURE: 99 MMHG | OXYGEN SATURATION: 100 % | HEART RATE: 92 BPM | BODY MASS INDEX: 31.89 KG/M2

## 2025-07-17 DIAGNOSIS — M77.8 LEFT ELBOW TENDONITIS: ICD-10-CM

## 2025-07-17 DIAGNOSIS — M25.522 LEFT ELBOW PAIN: Primary | ICD-10-CM

## 2025-07-17 PROCEDURE — 73080 X-RAY EXAM OF ELBOW: CPT

## 2025-07-17 PROCEDURE — 6370000000 HC RX 637 (ALT 250 FOR IP)

## 2025-07-17 PROCEDURE — 99283 EMERGENCY DEPT VISIT LOW MDM: CPT

## 2025-07-17 RX ORDER — VALACYCLOVIR HYDROCHLORIDE 500 MG/1
1 TABLET, FILM COATED ORAL DAILY
COMMUNITY

## 2025-07-17 RX ORDER — SERTRALINE HYDROCHLORIDE 25 MG/1
TABLET, FILM COATED ORAL
COMMUNITY

## 2025-07-17 RX ORDER — KETOROLAC TROMETHAMINE 10 MG/1
10 TABLET, FILM COATED ORAL EVERY 6 HOURS PRN
Qty: 20 TABLET | Refills: 0 | Status: SHIPPED | OUTPATIENT
Start: 2025-07-17 | End: 2025-07-24

## 2025-07-17 RX ORDER — LIDOCAINE 4 G/G
1 PATCH TOPICAL
Status: DISCONTINUED | OUTPATIENT
Start: 2025-07-17 | End: 2025-07-17 | Stop reason: HOSPADM

## 2025-07-17 RX ORDER — ASPIRIN 81 MG/1
81 TABLET ORAL DAILY
COMMUNITY

## 2025-07-17 ASSESSMENT — PAIN DESCRIPTION - ORIENTATION: ORIENTATION: LEFT

## 2025-07-17 ASSESSMENT — PAIN DESCRIPTION - LOCATION: LOCATION: ELBOW

## 2025-07-17 ASSESSMENT — PAIN SCALES - GENERAL: PAINLEVEL_OUTOF10: 7

## 2025-07-17 ASSESSMENT — LIFESTYLE VARIABLES
HOW OFTEN DO YOU HAVE A DRINK CONTAINING ALCOHOL: MONTHLY OR LESS
HOW MANY STANDARD DRINKS CONTAINING ALCOHOL DO YOU HAVE ON A TYPICAL DAY: 1 OR 2

## 2025-07-17 ASSESSMENT — PAIN - FUNCTIONAL ASSESSMENT: PAIN_FUNCTIONAL_ASSESSMENT: 0-10

## 2025-07-17 NOTE — ED TRIAGE NOTES
Pt to ED for eval of left lower arm pain that starts in left elbow and radiates to left wrist and occasionally up to neck.

## 2025-07-17 NOTE — DISCHARGE INSTRUCTIONS
You presented to the emergency department for evaluation of left elbow pain and left arm pain.  We discussed diagnostic studies which were negative for obvious fracture or dislocation.  We discussed possible tendinitis, musculoskeletal injury.  I recommend that you use anti-inflammatory medication as directed.  I recommend you follow-up with orthopedic surgery and return to the ER sooner for any new or worsening symptoms.

## 2025-07-21 ENCOUNTER — TELEPHONE (OUTPATIENT)
Age: 50
End: 2025-07-21

## 2025-07-21 NOTE — TELEPHONE ENCOUNTER
Left initial voicemail to schedule appointment. Patient is not MyChart active. Referral scanned in chart;L/Elbow

## 2025-07-29 ASSESSMENT — ENCOUNTER SYMPTOMS
EYES NEGATIVE: 1
RESPIRATORY NEGATIVE: 1

## 2025-07-29 NOTE — ED PROVIDER NOTES
[facility] EMERGENCY DEPARTMENT  EMERGENCY DEPARTMENT ENCOUNTER        Pt Name: Frederic Parker  MRN: [id number]  Birthdate 1975  Date of evaluation: 7/17/2025  Provider: Kassidy Byrnes PA-C  PCP: Leticia Aguirre APRN - NP  Note Started: 5:39 PM EDT 7/29/25      MANDEEP. I have evaluated this patient.        CHIEF COMPLAINT       Chief Complaint   Patient presents with    Arm Pain       HISTORY OF PRESENT ILLNESS: 1 or more Elements     History From: Patient            Chief Complaint: Left elbow pain    Frederic Parker is a 50 y.o. female who presents to the emergency room for evaluation of left elbow pain.  She states her 95, 2, trauma.  She denies numbness, tingling    Nursing Notes were all reviewed and agreed with or any disagreements were addressed in the HPI.    REVIEW OF SYSTEMS :      Review of Systems   Constitutional: Negative.    HENT: Negative.     Eyes: Negative.    Respiratory: Negative.     Endocrine: Negative.    Genitourinary: Negative.    Musculoskeletal: Negative.    Neurological: Negative.    Psychiatric/Behavioral: Negative.         Positives and Pertinent negatives as per HPI.     SURGICAL HISTORY     Past Surgical History:   Procedure Laterality Date    GYN      c/s x 2       CURRENTMEDICATIONS       Discharge Medication List as of 7/17/2025  7:54 PM        CONTINUE these medications which have NOT CHANGED    Details   aspirin 81 MG EC tablet Take 1 tablet by mouth dailyHistorical Med      sertraline (ZOLOFT) 25 MG tablet Historical Med      valACYclovir (VALTREX) 500 MG tablet Take 1 tablet by mouth dailyHistorical Med      Empagliflozin (JARDIANCE PO) Take by mouthHistorical Med      Dulaglutide (TRULICITY SC) Inject into the skinHistorical Med      busPIRone (BUSPAR) 5 MG tablet Take 1 tablet by mouth 3 times dailyHistorical Med      atorvastatin (LIPITOR) 10 MG tablet Take 1 tablet by mouth dailyHistorical Med      amLODIPine (NORVASC)